# Patient Record
Sex: FEMALE | Race: WHITE | NOT HISPANIC OR LATINO | URBAN - METROPOLITAN AREA
[De-identification: names, ages, dates, MRNs, and addresses within clinical notes are randomized per-mention and may not be internally consistent; named-entity substitution may affect disease eponyms.]

---

## 2020-02-20 ENCOUNTER — OFFICE VISIT (OUTPATIENT)
Dept: OBGYN CLINIC | Facility: CLINIC | Age: 25
End: 2020-02-20
Payer: COMMERCIAL

## 2020-02-20 VITALS
BODY MASS INDEX: 17.96 KG/M2 | DIASTOLIC BLOOD PRESSURE: 78 MMHG | HEIGHT: 64 IN | SYSTOLIC BLOOD PRESSURE: 126 MMHG | WEIGHT: 105.2 LBS

## 2020-02-20 DIAGNOSIS — Z30.42 DEPO-PROVERA CONTRACEPTIVE STATUS: ICD-10-CM

## 2020-02-20 DIAGNOSIS — Z01.419 ENCOUNTER FOR GYNECOLOGICAL EXAMINATION: Primary | ICD-10-CM

## 2020-02-20 DIAGNOSIS — Z30.09 BIRTH CONTROL COUNSELING: ICD-10-CM

## 2020-02-20 PROCEDURE — S0610 ANNUAL GYNECOLOGICAL EXAMINA: HCPCS | Performed by: NURSE PRACTITIONER

## 2020-02-20 RX ORDER — DROSPIRENONE AND ETHINYL ESTRADIOL 0.03MG-3MG
KIT ORAL
COMMUNITY
Start: 2018-07-13 | End: 2020-02-20 | Stop reason: ALTCHOICE

## 2020-02-20 RX ORDER — MEDROXYPROGESTERONE ACETATE 150 MG/ML
150 INJECTION, SUSPENSION INTRAMUSCULAR
Qty: 1 ML | Refills: 4 | Status: SHIPPED | OUTPATIENT
Start: 2020-02-20

## 2020-02-21 NOTE — PROGRESS NOTES
[facility]  1995    CC:  Yearly exam    S:  22 y o  female is a new patient here for yearly exam and birth control consultation  She denies breast concerns, abdominal/pelvic pain, abnormal vaginal discharge, bladder/bowel dysfunction  Her cycles are regular, very heavy lasting 8 days  Currently on OCP Tri elsy Hammond  Was started on OCP at age of 26 d/t menses only occurring 2 times per year  She would ideally like a contraceptive option that makes her amenorrheic  She is not sexually active  She uses OCP and condoms for contraception  She does not request STD testing today  Had Gardasil  Does have a hx of migraines with aura  Migraines don't seem to occur at certain time in menstrual cycle  Hx of breast cancer in Mother at age 55  Hx of ovarian cancer in both maternal and paternal grandmothers  Last Pap: 12/28/18-neg (patient to obtain records)    Originally from [facility]   Works in [place] as a           Current Outpatient Medications:     medroxyPROGESTERone (DEPO-PROVERA) 150 mg/mL injection, Inject 1 mL (150 mg total) into a muscle every 3 (three) months, Disp: 1 mL, Rfl: 4  Social History     Socioeconomic History    Marital status: Single     Spouse name: Not on file    Number of children: Not on file    Years of education: Not on file    Highest education level: Not on file   Occupational History    Not on file   Social Needs    Financial resource strain: Not on file    Food insecurity:     Worry: Not on file     Inability: Not on file    Transportation needs:     Medical: Not on file     Non-medical: Not on file   Tobacco Use    Smoking status: Never Smoker    Smokeless tobacco: Never Used   Substance and Sexual Activity    Alcohol use: Yes     Comment: social    Drug use: Never    Sexual activity: Not Currently     Partners: Male     Birth control/protection: OCP     Comment: not active in 6 months   Lifestyle    Physical activity:     Days per week: Not on file     Minutes per session: Not on file    Stress: Not on file   Relationships    Social connections:     Talks on phone: Not on file     Gets together: Not on file     Attends Taoism service: Not on file     Active member of club or organization: Not on file     Attends meetings of clubs or organizations: Not on file     Relationship status: Not on file    Intimate partner violence:     Fear of current or ex partner: Not on file     Emotionally abused: Not on file     Physically abused: Not on file     Forced sexual activity: Not on file   Other Topics Concern    Not on file   Social History Narrative    Not on file     Family History   Problem Relation Age of Onset    Breast cancer Mother 55        double masectomy    Thyroid disease Mother     Asthma Father     Hyperlipidemia Father     Arthritis Maternal Grandmother     Ovarian cancer Maternal Grandmother     Heart disease Maternal Grandfather     Breast cancer Paternal Grandmother 36        onset in 42's- single masectomy    Ovarian cancer Paternal Grandmother     Heart disease Paternal Grandfather      Past Medical History:   Diagnosis Date    Migraine          O:  Blood pressure 126/78, height 5' 4" (1 626 m), weight 47 7 kg (105 lb 3 2 oz), last menstrual period 02/10/2020  Patient appears well and is not in distress  Neck is supple without masses  Breasts are symmetrical without mass, tenderness, nipple discharge, skin changes or adenopathy  Abdomen is soft and nontender without masses  External genitals are normal without lesions or rashes  Vagina is normal without discharge or bleeding  Cervix is normal without discharge or lesion  Uterus is normal, mobile, nontender without palpable mass  Adnexa are normal, nontender, without palpable mass  A:  Yearly exam      P:    Pap up to date  Reviewed ASCCP guidelines with patient  Pap due 2021  Recommend annual CBE and monthly SBE  Had Gardasil x 3    Explained to patient that contraception containing estrogen is contraindicated in patients with migraines with aura  Recommend patient discontinue OCP  Discussed all progesterone only options including POP, Depo, Nexplanon, and IUD  Patient desires to initiate depo  Patient will RTO in 2 weeks during menses for first depo injection  Patient aware recommend condom use for prevention of STIs  D/t family hx of both breast and ovarian cancer, discussed option of referral for genetic counseling with gyn/onc  Patient declines this today  RTO one year for yearly exam or sooner as needed

## 2020-03-04 ENCOUNTER — TELEPHONE (OUTPATIENT)
Dept: OBGYN CLINIC | Facility: CLINIC | Age: 25
End: 2020-03-04

## 2020-03-04 NOTE — TELEPHONE ENCOUNTER
Patient depo was called in to [facility] [address] and patient needs it called into [facility] at [facility] patient stated [facility] not [facility]

## 2020-03-09 ENCOUNTER — CLINICAL SUPPORT (OUTPATIENT)
Dept: OBGYN CLINIC | Facility: CLINIC | Age: 25
End: 2020-03-09
Payer: COMMERCIAL

## 2020-03-09 VITALS — DIASTOLIC BLOOD PRESSURE: 68 MMHG | SYSTOLIC BLOOD PRESSURE: 104 MMHG | BODY MASS INDEX: 17.9 KG/M2 | WEIGHT: 104.3 LBS

## 2020-03-09 DIAGNOSIS — Z30.8 ENCOUNTER FOR OTHER CONTRACEPTIVE MANAGEMENT: Primary | ICD-10-CM

## 2020-03-09 DIAGNOSIS — Z32.02 URINE PREGNANCY TEST NEGATIVE: ICD-10-CM

## 2020-03-09 PROCEDURE — 81025 URINE PREGNANCY TEST: CPT

## 2020-03-09 PROCEDURE — 96372 THER/PROPH/DIAG INJ SC/IM: CPT | Performed by: OBSTETRICS & GYNECOLOGY

## 2020-03-09 RX ADMIN — MEDROXYPROGESTERONE ACETATE 150 MG: 150 INJECTION, SUSPENSION INTRAMUSCULAR at 15:54

## 2020-03-09 NOTE — PROGRESS NOTES
Date last annual exam 2/20/20  Last Depo-Provera: : First today  Side Effects if any: Discussed potential side effects and signs to report  Serum HCG indicated? Yes  Negative  Depo-Provera 150 mg IM given by: Vianca Vaughn  Next appointment due 11-13 weeks

## 2020-03-10 LAB — SL AMB POCT URINE HCG: NORMAL

## 2020-03-10 RX ORDER — MEDROXYPROGESTERONE ACETATE 150 MG/ML
150 INJECTION, SUSPENSION INTRAMUSCULAR
Status: SHIPPED | OUTPATIENT
Start: 2020-03-10

## 2020-05-27 ENCOUNTER — CLINICAL SUPPORT (OUTPATIENT)
Dept: OBGYN CLINIC | Facility: CLINIC | Age: 25
End: 2020-05-27
Payer: COMMERCIAL

## 2020-05-27 DIAGNOSIS — Z30.42 DEPO-PROVERA CONTRACEPTIVE STATUS: ICD-10-CM

## 2020-05-27 PROCEDURE — 96372 THER/PROPH/DIAG INJ SC/IM: CPT | Performed by: PHYSICIAN ASSISTANT

## 2020-05-27 RX ADMIN — MEDROXYPROGESTERONE ACETATE 150 MG: 150 INJECTION, SUSPENSION INTRAMUSCULAR at 17:56

## 2020-08-05 ENCOUNTER — TELEPHONE (OUTPATIENT)
Dept: OBGYN CLINIC | Facility: CLINIC | Age: 25
End: 2020-08-05

## 2020-08-12 ENCOUNTER — CLINICAL SUPPORT (OUTPATIENT)
Dept: OBGYN CLINIC | Facility: CLINIC | Age: 25
End: 2020-08-12
Payer: COMMERCIAL

## 2020-08-12 VITALS — DIASTOLIC BLOOD PRESSURE: 72 MMHG | BODY MASS INDEX: 18.37 KG/M2 | WEIGHT: 107 LBS | SYSTOLIC BLOOD PRESSURE: 120 MMHG

## 2020-08-12 DIAGNOSIS — Z30.42 DEPO-PROVERA CONTRACEPTIVE STATUS: ICD-10-CM

## 2020-08-12 PROCEDURE — 96372 THER/PROPH/DIAG INJ SC/IM: CPT | Performed by: NURSE PRACTITIONER

## 2020-08-12 RX ADMIN — MEDROXYPROGESTERONE ACETATE 150 MG: 150 INJECTION, SUSPENSION INTRAMUSCULAR at 17:57

## 2020-08-12 NOTE — PROGRESS NOTES
Date last pap: 2/20/20  Last Depo-Provera: 05/27/20  Side Effects if any: Mild spotting daily  Serum HCG indicated? No   Depo-Provera 150 mg IM given by: CRISPIN Harper RN  Next [place] due 10/28-11/11    See Mar for Exp and Lot #

## 2020-10-19 ENCOUNTER — TELEPHONE (OUTPATIENT)
Dept: OBGYN CLINIC | Facility: CLINIC | Age: 25
End: 2020-10-19

## 2021-04-08 DIAGNOSIS — Z23 ENCOUNTER FOR IMMUNIZATION: ICD-10-CM

## 2021-06-18 ENCOUNTER — DOCTOR'S OFFICE (OUTPATIENT)
Dept: URBAN - NONMETROPOLITAN AREA CLINIC 1 | Facility: CLINIC | Age: 26
Setting detail: OPHTHALMOLOGY
End: 2021-06-18
Payer: COMMERCIAL

## 2021-06-18 DIAGNOSIS — H10.13: ICD-10-CM

## 2021-06-18 DIAGNOSIS — H01.004: ICD-10-CM

## 2021-06-18 DIAGNOSIS — H04.123: ICD-10-CM

## 2021-06-18 DIAGNOSIS — H01.005: ICD-10-CM

## 2021-06-18 DIAGNOSIS — H02.88B: ICD-10-CM

## 2021-06-18 PROCEDURE — 92002 INTRM OPH EXAM NEW PATIENT: CPT | Performed by: OPHTHALMOLOGY

## 2021-06-18 PROCEDURE — 83861 MICROFLUID ANALY TEARS: CPT | Performed by: OPHTHALMOLOGY

## 2021-06-18 ASSESSMENT — LID EXAM ASSESSMENTS
OS_MEIBOMITIS: 2+ 3+
OS_BLEPHARITIS: T

## 2021-06-18 ASSESSMENT — REFRACTION_MANIFEST
OD_VA2: 20/20
OD_SPHERE: -3.50
OD_VA1: 20/20
OS_VA1: 20/20
OS_CYLINDER: SPH
OS_VA2: 20/20
OS_SPHERE: -3.50
OD_CYLINDER: SPH

## 2021-06-18 ASSESSMENT — REFRACTION_AUTOREFRACTION
OS_AXIS: 140
OD_AXIS: 167
OS_SPHERE: -3.00
OS_CYLINDER: -0.50
OD_CYLINDER: -0.25
OD_SPHERE: -3.25

## 2021-06-18 ASSESSMENT — SPHEQUIV_DERIVED
OS_SPHEQUIV: -3.25
OD_SPHEQUIV: -3.375

## 2021-06-18 ASSESSMENT — REFRACTION_CURRENTRX
OS_VPRISM_DIRECTION: SV
OD_VPRISM_DIRECTION: SV
OD_SPHERE: -3.50
OS_AXIS: 180
OS_CYLINDER: 0.00
OD_CYLINDER: 0.00
OD_OVR_VA: 20/
OD_AXIS: 180
OS_OVR_VA: 20/
OS_SPHERE: -3.50

## 2021-06-18 ASSESSMENT — KERATOMETRY
OS_K1POWER_DIOPTERS: 44.25
OD_K1POWER_DIOPTERS: 44.00
OS_AXISANGLE_DEGREES: 85
OD_K2POWER_DIOPTERS: 45.00
OS_K2POWER_DIOPTERS: 45.50
OD_AXISANGLE_DEGREES: 90

## 2021-06-18 ASSESSMENT — DRY EYES - PHYSICIAN NOTES
OD_GENERALCOMMENTS: TRACE PEE
OS_GENERALCOMMENTS: TRACE PEE

## 2021-06-18 ASSESSMENT — AXIALLENGTH_DERIVED
OD_AL: 24.5792
OS_AL: 24.3784

## 2021-06-18 ASSESSMENT — CONFRONTATIONAL VISUAL FIELD TEST (CVF)
OS_FINDINGS: FULL
OD_FINDINGS: FULL

## 2021-06-18 ASSESSMENT — VISUAL ACUITY
OS_BCVA: 20/20
OD_BCVA: 20/20

## 2021-07-06 ENCOUNTER — RX ONLY (RX ONLY)
Age: 26
End: 2021-07-06

## 2021-07-06 ENCOUNTER — DOCTOR'S OFFICE (OUTPATIENT)
Dept: URBAN - NONMETROPOLITAN AREA CLINIC 1 | Facility: CLINIC | Age: 26
Setting detail: OPHTHALMOLOGY
End: 2021-07-06
Payer: COMMERCIAL

## 2021-07-06 DIAGNOSIS — H04.123: ICD-10-CM

## 2021-07-06 DIAGNOSIS — H10.13: ICD-10-CM

## 2021-07-06 DIAGNOSIS — H02.88B: ICD-10-CM

## 2021-07-06 PROCEDURE — 92012 INTRM OPH EXAM EST PATIENT: CPT | Performed by: OPHTHALMOLOGY

## 2021-07-06 ASSESSMENT — REFRACTION_MANIFEST
OD_VA1: 20/20
OD_CYLINDER: SPH
OS_VA2: 20/20
OS_SPHERE: -3.50
OD_VA2: 20/20
OS_CYLINDER: SPH
OS_VA1: 20/20
OD_SPHERE: -3.50

## 2021-07-06 ASSESSMENT — REFRACTION_CURRENTRX
OD_AXIS: 180
OD_SPHERE: -3.50
OD_OVR_VA: 20/
OS_AXIS: 180
OS_CYLINDER: 0.00
OS_SPHERE: -3.50
OD_CYLINDER: 0.00
OS_VPRISM_DIRECTION: SV
OD_VPRISM_DIRECTION: SV
OS_OVR_VA: 20/

## 2021-07-06 ASSESSMENT — REFRACTION_AUTOREFRACTION
OS_CYLINDER: -1.25
OD_SPHERE: -3.75
OS_SPHERE: -3.50
OS_AXIS: 176
OD_CYLINDER: SPH

## 2021-07-06 ASSESSMENT — SPHEQUIV_DERIVED: OS_SPHEQUIV: -4.125

## 2021-07-06 ASSESSMENT — KERATOMETRY
OS_AXISANGLE_DEGREES: 092
OS_K2POWER_DIOPTERS: 46.50
OD_K2POWER_DIOPTERS: 45.75
OS_K1POWER_DIOPTERS: 44.00
OD_AXISANGLE_DEGREES: 090
OD_K1POWER_DIOPTERS: 44.50

## 2021-07-06 ASSESSMENT — DRY EYES - PHYSICIAN NOTES
OS_GENERALCOMMENTS: 2+ PEE
OD_GENERALCOMMENTS: 1+ PEE

## 2021-07-06 ASSESSMENT — VISUAL ACUITY
OS_BCVA: 20/20
OD_BCVA: 20/20-1

## 2021-07-06 ASSESSMENT — AXIALLENGTH_DERIVED: OS_AL: 24.5973

## 2021-08-06 ENCOUNTER — DOCTOR'S OFFICE (OUTPATIENT)
Dept: URBAN - NONMETROPOLITAN AREA CLINIC 1 | Facility: CLINIC | Age: 26
Setting detail: OPHTHALMOLOGY
End: 2021-08-06
Payer: COMMERCIAL

## 2021-08-06 ENCOUNTER — RX ONLY (RX ONLY)
Age: 26
End: 2021-08-06

## 2021-08-06 DIAGNOSIS — H02.88B: ICD-10-CM

## 2021-08-06 DIAGNOSIS — H04.123: ICD-10-CM

## 2021-08-06 DIAGNOSIS — H10.13: ICD-10-CM

## 2021-08-06 PROCEDURE — 92012 INTRM OPH EXAM EST PATIENT: CPT | Performed by: OPHTHALMOLOGY

## 2021-08-06 ASSESSMENT — REFRACTION_CURRENTRX
OS_CYLINDER: 0.00
OS_OVR_VA: 20/
OS_SPHERE: -3.50
OD_AXIS: 180
OS_VPRISM_DIRECTION: SV
OD_OVR_VA: 20/
OD_SPHERE: -3.50
OD_VPRISM_DIRECTION: SV
OD_CYLINDER: 0.00
OS_AXIS: 180

## 2021-08-06 ASSESSMENT — REFRACTION_AUTOREFRACTION
OD_AXIS: 151
OD_SPHERE: -3.75
OS_CYLINDER: -1.00
OS_SPHERE: +3.75
OD_CYLINDER: -0.75
OS_AXIS: 180

## 2021-08-06 ASSESSMENT — REFRACTION_MANIFEST
OD_CYLINDER: SPH
OS_VA1: 20/20
OS_VA2: 20/20
OD_VA1: 20/20
OD_VA2: 20/20
OS_SPHERE: -3.50
OD_SPHERE: -3.50
OS_CYLINDER: SPH

## 2021-08-06 ASSESSMENT — SPHEQUIV_DERIVED
OS_SPHEQUIV: 3.25
OD_SPHEQUIV: -4.125

## 2021-08-06 ASSESSMENT — KERATOMETRY
OS_AXISANGLE_DEGREES: 090
OD_K1POWER_DIOPTERS: 44.00
OD_AXISANGLE_DEGREES: 089
OS_K2POWER_DIOPTERS: 48.00
OD_K2POWER_DIOPTERS: 45.25
OS_K1POWER_DIOPTERS: 44.25

## 2021-08-06 ASSESSMENT — AXIALLENGTH_DERIVED
OS_AL: 21.5542
OD_AL: 24.8494

## 2021-08-06 ASSESSMENT — CONFRONTATIONAL VISUAL FIELD TEST (CVF)
OS_FINDINGS: FULL
OD_FINDINGS: FULL

## 2021-08-06 ASSESSMENT — VISUAL ACUITY
OD_BCVA: 20/20
OS_BCVA: 20/20

## 2021-08-06 ASSESSMENT — DRY EYES - PHYSICIAN NOTES
OS_GENERALCOMMENTS: 2+ PEE
OD_GENERALCOMMENTS: 1+ PEE

## 2021-09-17 ENCOUNTER — RX ONLY (RX ONLY)
Age: 26
End: 2021-09-17

## 2021-09-17 ENCOUNTER — DOCTOR'S OFFICE (OUTPATIENT)
Dept: URBAN - NONMETROPOLITAN AREA CLINIC 1 | Facility: CLINIC | Age: 26
Setting detail: OPHTHALMOLOGY
End: 2021-09-17
Payer: COMMERCIAL

## 2021-09-17 DIAGNOSIS — H01.005: ICD-10-CM

## 2021-09-17 DIAGNOSIS — H04.123: ICD-10-CM

## 2021-09-17 DIAGNOSIS — H10.13: ICD-10-CM

## 2021-09-17 DIAGNOSIS — H01.004: ICD-10-CM

## 2021-09-17 DIAGNOSIS — H02.88B: ICD-10-CM

## 2021-09-17 PROCEDURE — 99213 OFFICE O/P EST LOW 20 MIN: CPT | Performed by: OPHTHALMOLOGY

## 2021-09-17 ASSESSMENT — AXIALLENGTH_DERIVED
OS_AL: 21.5542
OD_AL: 24.8494

## 2021-09-17 ASSESSMENT — REFRACTION_MANIFEST
OS_VA2: 20/20
OD_CYLINDER: SPH
OS_SPHERE: -3.50
OD_VA2: 20/20
OS_VA1: 20/20
OD_SPHERE: -3.50
OS_CYLINDER: SPH
OD_VA1: 20/20

## 2021-09-17 ASSESSMENT — VISUAL ACUITY
OS_BCVA: 20/20
OD_BCVA: 20/20

## 2021-09-17 ASSESSMENT — REFRACTION_AUTOREFRACTION
OD_CYLINDER: -0.75
OS_SPHERE: +3.75
OS_AXIS: 180
OS_CYLINDER: -1.00
OD_AXIS: 151
OD_SPHERE: -3.75

## 2021-09-17 ASSESSMENT — REFRACTION_CURRENTRX
OD_CYLINDER: 0.00
OS_SPHERE: -3.50
OD_AXIS: 180
OS_VPRISM_DIRECTION: SV
OS_CYLINDER: 0.00
OD_SPHERE: -3.50
OS_AXIS: 180
OD_OVR_VA: 20/
OS_OVR_VA: 20/
OD_VPRISM_DIRECTION: SV

## 2021-09-17 ASSESSMENT — KERATOMETRY
OD_AXISANGLE_DEGREES: 089
OS_AXISANGLE_DEGREES: 090
OS_K1POWER_DIOPTERS: 44.25
OD_K1POWER_DIOPTERS: 44.00
OD_K2POWER_DIOPTERS: 45.25
OS_K2POWER_DIOPTERS: 48.00

## 2021-09-17 ASSESSMENT — DRY EYES - PHYSICIAN NOTES
OD_GENERALCOMMENTS: 1+ PEE
OS_GENERALCOMMENTS: 2+ PEE

## 2021-09-17 ASSESSMENT — SPHEQUIV_DERIVED
OS_SPHEQUIV: 3.25
OD_SPHEQUIV: -4.125

## 2021-09-17 ASSESSMENT — CONFRONTATIONAL VISUAL FIELD TEST (CVF)
OS_FINDINGS: FULL
OD_FINDINGS: FULL

## 2022-03-15 ENCOUNTER — DOCTOR'S OFFICE (OUTPATIENT)
Dept: URBAN - NONMETROPOLITAN AREA CLINIC 1 | Facility: CLINIC | Age: 27
Setting detail: OPHTHALMOLOGY
End: 2022-03-15
Payer: COMMERCIAL

## 2022-03-15 DIAGNOSIS — H10.13: ICD-10-CM

## 2022-03-15 DIAGNOSIS — H02.88B: ICD-10-CM

## 2022-03-15 DIAGNOSIS — H04.123: ICD-10-CM

## 2022-03-15 DIAGNOSIS — H04.122: ICD-10-CM

## 2022-03-15 DIAGNOSIS — Z98.89: ICD-10-CM

## 2022-03-15 PROBLEM — H01.004 BLEPHARITIS; LEFT UPPER LID, LEFT LOWER LID: Status: RESOLVED | Noted: 2021-06-18 | Resolved: 2022-03-15

## 2022-03-15 PROBLEM — H04.121 DRY EYE; RIGHT EYE, LEFT EYE: Status: ACTIVE | Noted: 2021-09-17

## 2022-03-15 PROBLEM — H01.005 BLEPHARITIS; LEFT UPPER LID, LEFT LOWER LID: Status: RESOLVED | Noted: 2021-06-18 | Resolved: 2022-03-15

## 2022-03-15 PROCEDURE — 83861 MICROFLUID ANALY TEARS: CPT | Performed by: OPHTHALMOLOGY

## 2022-03-15 PROCEDURE — 99214 OFFICE O/P EST MOD 30 MIN: CPT | Performed by: OPHTHALMOLOGY

## 2022-03-15 ASSESSMENT — VISUAL ACUITY
OD_BCVA: 20/20
OS_BCVA: 20/20

## 2022-03-15 ASSESSMENT — REFRACTION_MANIFEST
OS_CYLINDER: SPH
OS_VA1: 20/20
OS_SPHERE: -3.50
OD_CYLINDER: SPH
OD_VA1: 20/20
OD_VA2: 20/20
OS_VA2: 20/20
OD_SPHERE: -3.50

## 2022-03-15 ASSESSMENT — REFRACTION_CURRENTRX
OS_AXIS: 180
OS_CYLINDER: 0.00
OD_OVR_VA: 20/
OS_OVR_VA: 20/
OS_VPRISM_DIRECTION: SV
OD_CYLINDER: 0.00
OD_VPRISM_DIRECTION: SV
OS_SPHERE: -3.50
OD_AXIS: 180
OD_SPHERE: -3.50

## 2022-03-15 ASSESSMENT — REFRACTION_AUTOREFRACTION
OS_SPHERE: +0.75
OD_SPHERE: +0.25
OS_CYLINDER: 0.00
OD_CYLINDER: 0.00

## 2022-03-15 ASSESSMENT — CONFRONTATIONAL VISUAL FIELD TEST (CVF)
OD_FINDINGS: FULL
OS_FINDINGS: FULL

## 2022-03-15 ASSESSMENT — KERATOMETRY
OD_K1POWER_DIOPTERS: 41.00
OS_AXISANGLE_DEGREES: 099
OD_K2POWER_DIOPTERS: 41.50
OD_AXISANGLE_DEGREES: 092
OS_K2POWER_DIOPTERS: 41.75
OS_K1POWER_DIOPTERS: 41.00

## 2022-03-15 ASSESSMENT — SPHEQUIV_DERIVED
OS_SPHEQUIV: 0.75
OD_SPHEQUIV: 0.25

## 2022-03-15 ASSESSMENT — AXIALLENGTH_DERIVED
OD_AL: 24.3442
OS_AL: 24.0906

## 2024-02-21 PROBLEM — Z01.419 ENCOUNTER FOR GYNECOLOGICAL EXAMINATION: Status: RESOLVED | Noted: 2020-02-20 | Resolved: 2024-02-21

## 2024-06-14 ENCOUNTER — ANESTHESIA (OUTPATIENT)
Dept: GASTROENTEROLOGY | Facility: HOSPITAL | Age: 29
End: 2024-06-14

## 2024-06-14 ENCOUNTER — ANESTHESIA EVENT (OUTPATIENT)
Dept: GASTROENTEROLOGY | Facility: HOSPITAL | Age: 29
End: 2024-06-14

## 2024-06-14 ENCOUNTER — HOSPITAL ENCOUNTER (OUTPATIENT)
Dept: GASTROENTEROLOGY | Facility: HOSPITAL | Age: 29
Setting detail: OUTPATIENT SURGERY
End: 2024-06-14
Attending: HOSPITALIST
Payer: COMMERCIAL

## 2024-06-14 VITALS
TEMPERATURE: 97.6 F | OXYGEN SATURATION: 98 % | HEIGHT: 64 IN | DIASTOLIC BLOOD PRESSURE: 79 MMHG | SYSTOLIC BLOOD PRESSURE: 143 MMHG | BODY MASS INDEX: 22.36 KG/M2 | WEIGHT: 131 LBS | HEART RATE: 80 BPM | RESPIRATION RATE: 22 BRPM

## 2024-06-14 DIAGNOSIS — K21.9 GASTRO-ESOPHAGEAL REFLUX DISEASE WITHOUT ESOPHAGITIS: ICD-10-CM

## 2024-06-14 DIAGNOSIS — R14.2 BELCHING: ICD-10-CM

## 2024-06-14 LAB
EXT PREGNANCY TEST URINE: NEGATIVE
EXT. CONTROL: NORMAL

## 2024-06-14 PROCEDURE — 88305 TISSUE EXAM BY PATHOLOGIST: CPT | Performed by: PATHOLOGY

## 2024-06-14 PROCEDURE — 81025 URINE PREGNANCY TEST: CPT | Performed by: HOSPITALIST

## 2024-06-14 RX ORDER — LIDOCAINE HYDROCHLORIDE 20 MG/ML
INJECTION, SOLUTION EPIDURAL; INFILTRATION; INTRACAUDAL; PERINEURAL AS NEEDED
Status: DISCONTINUED | OUTPATIENT
Start: 2024-06-14 | End: 2024-06-14

## 2024-06-14 RX ORDER — SODIUM CHLORIDE, SODIUM LACTATE, POTASSIUM CHLORIDE, CALCIUM CHLORIDE 600; 310; 30; 20 MG/100ML; MG/100ML; MG/100ML; MG/100ML
INJECTION, SOLUTION INTRAVENOUS CONTINUOUS PRN
Status: DISCONTINUED | OUTPATIENT
Start: 2024-06-14 | End: 2024-06-14

## 2024-06-14 RX ORDER — PROPOFOL 10 MG/ML
INJECTION, EMULSION INTRAVENOUS AS NEEDED
Status: DISCONTINUED | OUTPATIENT
Start: 2024-06-14 | End: 2024-06-14

## 2024-06-14 RX ADMIN — PROPOFOL 250 MG: 10 INJECTION, EMULSION INTRAVENOUS at 10:49

## 2024-06-14 RX ADMIN — SODIUM CHLORIDE, SODIUM LACTATE, POTASSIUM CHLORIDE, AND CALCIUM CHLORIDE: .6; .31; .03; .02 INJECTION, SOLUTION INTRAVENOUS at 10:37

## 2024-06-14 RX ADMIN — LIDOCAINE HYDROCHLORIDE 100 MG: 20 INJECTION, SOLUTION EPIDURAL; INFILTRATION; INTRACAUDAL; PERINEURAL at 10:49

## 2024-06-14 RX ADMIN — PROPOFOL 50 MG: 10 INJECTION, EMULSION INTRAVENOUS at 10:53

## 2024-06-14 RX ADMIN — PROPOFOL 50 MG: 10 INJECTION, EMULSION INTRAVENOUS at 10:51

## 2024-06-14 NOTE — ANESTHESIA PREPROCEDURE EVALUATION
Procedure:  EGD    Relevant Problems   No relevant active problems        Physical Exam    Airway    Mallampati score: I  TM Distance: >3 FB  Neck ROM: full     Dental   No notable dental hx     Cardiovascular  Rhythm: regular, Rate: normal, Cardiovascular exam normal    Pulmonary  Pulmonary exam normal Breath sounds clear to auscultation    Other Findings  post-pubertal.      Anesthesia Plan  ASA Score- 1     Anesthesia Type- IV sedation with anesthesia with ASA Monitors.         Additional Monitors:     Airway Plan: NTT.           Plan Factors-Exercise tolerance (METS): >4 METS.    Chart reviewed. EKG reviewed. Imaging results reviewed. Existing labs reviewed. Patient summary reviewed.    Patient is not a current smoker.  Patient did not smoke on day of surgery.            Induction- intravenous.    Postoperative Plan- Plan for postoperative opioid use.     Perioperative Resuscitation Plan - Level 1 - Full Code.       Informed Consent- Anesthetic plan and risks discussed with patient.  I personally reviewed this patient with the CRNA. Discussed and agreed on the Anesthesia Plan with the CRNA..

## 2024-06-14 NOTE — H&P
Procedure(s):    Esophagogastroduodenuoscopy with the indication(s) of GERD symptoms    Endoscopy Pre-Procedure Assessment:  Prior to the procedure, the patient is identified.  The patient's history, medications, and allergies have been reviewed.  The patient is competent.  The risks and benefits of the procedure procedure and the planned sedation have been discussed with the patient.  All questions have been answered and informed consent for the procedure has been obtained.    Vitals:    06/14/24 0949   BP: 154/94   Pulse: 87   Resp: 20   Temp: 98.2 °F (36.8 °C)   SpO2: 98%       Physical Exam:  Physical Exam  Eyes:      Conjunctiva/sclera: Conjunctivae normal.   Cardiovascular:      Rate and Rhythm: Normal rate.   Pulmonary:      Effort: Pulmonary effort is normal.   Abdominal:      Palpations: Abdomen is soft.   Neurological:      General: No focal deficit present.      Mental Status: She is alert.   Psychiatric:         Mood and Affect: Mood normal.                Consent:    We have discussed the procedure in detail. We reviewed risks, benefits and alternative as well as potentional complications including and not limited to medication side effect , infection, bleeding, perforation and the potential need for surgery, ICU admission, CPR, as well as the need for blood product transfusion. Patient verbalized understanding and agreement. All patient questions were answered.     After reviewed the risks and benefits, the patient is deemed in satisfactory condition to undergo the procedure.  The anesthesia plan is to use monitored anesthesia care (MAC).      06/14/24

## 2024-06-14 NOTE — ANESTHESIA POSTPROCEDURE EVALUATION
Post-Op Assessment Note    CV Status:  Stable  Pain Score: 0    Pain management: adequate       Mental Status:  Alert and awake   Hydration Status:  Euvolemic   PONV Controlled:  Controlled   Airway Patency:  Patent     Post Op Vitals Reviewed: Yes    No anethesia notable event occurred.    Staff: CRNA               BP   118/71   Temp   97.6   Pulse  81   Resp   18   SpO2   100

## 2024-06-18 PROCEDURE — 88305 TISSUE EXAM BY PATHOLOGIST: CPT | Performed by: PATHOLOGY

## 2024-08-19 ENCOUNTER — HOSPITAL ENCOUNTER (EMERGENCY)
Facility: HOSPITAL | Age: 29
Discharge: HOME | End: 2024-08-19
Attending: EMERGENCY MEDICINE
Payer: COMMERCIAL

## 2024-08-19 ENCOUNTER — APPOINTMENT (EMERGENCY)
Dept: CT IMAGING | Facility: HOSPITAL | Age: 29
End: 2024-08-19
Payer: COMMERCIAL

## 2024-08-19 VITALS
TEMPERATURE: 97.9 F | DIASTOLIC BLOOD PRESSURE: 119 MMHG | RESPIRATION RATE: 16 BRPM | BODY MASS INDEX: 23.56 KG/M2 | HEIGHT: 64 IN | OXYGEN SATURATION: 99 % | SYSTOLIC BLOOD PRESSURE: 178 MMHG | HEART RATE: 103 BPM | WEIGHT: 138.01 LBS

## 2024-08-19 DIAGNOSIS — S02.2XXA CLOSED FRACTURE OF NASAL BONE, INITIAL ENCOUNTER: Primary | ICD-10-CM

## 2024-08-19 PROCEDURE — 70486 CT MAXILLOFACIAL W/O DYE: CPT

## 2024-08-19 PROCEDURE — 99284 EMERGENCY DEPT VISIT MOD MDM: CPT

## 2024-08-19 PROCEDURE — 99284 EMERGENCY DEPT VISIT MOD MDM: CPT | Performed by: EMERGENCY MEDICINE

## 2024-08-19 RX ORDER — OXYMETAZOLINE HYDROCHLORIDE 0.05 G/100ML
2 SPRAY NASAL ONCE
Status: COMPLETED | OUTPATIENT
Start: 2024-08-19 | End: 2024-08-19

## 2024-08-19 RX ORDER — HYDROCODONE BITARTRATE AND ACETAMINOPHEN 5; 325 MG/1; MG/1
1 TABLET ORAL EVERY 6 HOURS PRN
Qty: 10 TABLET | Refills: 0 | Status: SHIPPED | OUTPATIENT
Start: 2024-08-19

## 2024-08-19 RX ORDER — HYDROCODONE BITARTRATE AND ACETAMINOPHEN 5; 325 MG/1; MG/1
1 TABLET ORAL ONCE
Status: COMPLETED | OUTPATIENT
Start: 2024-08-19 | End: 2024-08-19

## 2024-08-19 RX ADMIN — OXYMETAZOLINE HYDROCHLORIDE 2 SPRAY: 0.05 SPRAY NASAL at 00:25

## 2024-08-19 RX ADMIN — HYDROCODONE BITARTRATE AND ACETAMINOPHEN 1 TABLET: 5; 325 TABLET ORAL at 00:24

## 2024-08-19 NOTE — ED PROVIDER NOTES
History  Chief Complaint   Patient presents with    Nose Problem     Slid in the shower and hit nose off the clip of bathtub      Patient is a 29-year-old female, presenting to the emergency department complaining of nasal injury, states she slipped in the bathtub, hitting her nose on the lip of the bathtub, she denies any loss of consciousness, she has pain with swelling to her nose and epistaxis, she denies pain or injury elsewhere, does not take a blood thinner, denies pregnancy        Prior to Admission Medications   Prescriptions Last Dose Informant Patient Reported? Taking?   Lo Loestrin Fe 1 MG-10 MCG / 10 MCG TABS   Yes No   Sig: Take 1 tablet by mouth every morning   cetirizine (ZyrTEC) 10 mg tablet   Yes No   Sig: Take 10 mg by mouth daily   clobetasol (TEMOVATE) 0.05 % ointment   Yes No   Sig: ONLY APPLY TO THE FINGERS ONCE A DAY AS NEEDED FOR DRY SCALY SKIN   famotidine (PEPCID) 20 mg tablet   Yes No   Sig: Take 20 mg by mouth 2 (two) times a day   medroxyPROGESTERone (DEPO-PROVERA) 150 mg/mL injection  Self No No   Sig: Inject 1 mL (150 mg total) into a muscle every 3 (three) months   Patient not taking: Reported on 2024   olopatadine (PATANOL) 0.1 % ophthalmic solution   Yes No   Si drop   omeprazole (PriLOSEC) 20 mg delayed release capsule   Yes No   sertraline (ZOLOFT) 50 mg tablet   Yes No   Sig: Take 50 mg by mouth daily at bedtime      Facility-Administered Medications Last Administration Doses Remaining   medroxyPROGESTERone (DEPO-PROVERA) IM injection 150 mg 2020  5:57 PM           Past Medical History:   Diagnosis Date    GERD (gastroesophageal reflux disease)     Kidney stone     Migraine        Past Surgical History:   Procedure Laterality Date    KIDNEY STONE SURGERY         Family History   Problem Relation Age of Onset    Breast cancer Mother 46        double masectomy    Thyroid disease Mother     Asthma Father     Hyperlipidemia Father     Arthritis Maternal Grandmother      Ovarian cancer Maternal Grandmother     Heart disease Maternal Grandfather     Breast cancer Paternal Grandmother 40        onset in 40's- single masectomy    Ovarian cancer Paternal Grandmother     Heart disease Paternal Grandfather      I have reviewed and agree with the history as documented.    E-Cigarette/Vaping    E-Cigarette Use Never User      E-Cigarette/Vaping Substances    Nicotine No     THC No     CBD No     Flavoring No     Other No     Unknown No      Social History     Tobacco Use    Smoking status: Never    Smokeless tobacco: Never   Vaping Use    Vaping status: Never Used   Substance Use Topics    Alcohol use: Yes     Comment: social    Drug use: Never       Review of Systems   Constitutional: Negative.    HENT:  Positive for nosebleeds.    Eyes: Negative.    Respiratory: Negative.     Cardiovascular: Negative.    Gastrointestinal: Negative.    Endocrine: Negative.    Genitourinary: Negative.    Musculoskeletal: Negative.    Skin: Negative.    Allergic/Immunologic: Negative.    Neurological: Negative.    Hematological: Negative.    Psychiatric/Behavioral: Negative.         Physical Exam  Physical Exam  Constitutional:       Appearance: She is well-developed.   HENT:      Head: Normocephalic and atraumatic.        Comments: Swelling to nasal bridge with slight ecchymosis, no bony deformity, epistaxis, no septal hematoma  Eyes:      Conjunctiva/sclera: Conjunctivae normal.      Pupils: Pupils are equal, round, and reactive to light.   Cardiovascular:      Rate and Rhythm: Normal rate.   Pulmonary:      Effort: Pulmonary effort is normal.   Abdominal:      Palpations: Abdomen is soft.   Musculoskeletal:         General: Normal range of motion.      Cervical back: Normal range of motion and neck supple.   Skin:     General: Skin is warm and dry.   Neurological:      Mental Status: She is alert and oriented to person, place, and time.         Vital Signs  ED Triage Vitals [08/19/24 0007]   Temperature  Pulse Respirations Blood Pressure SpO2   97.9 °F (36.6 °C) 103 16 (!) 178/119 99 %      Temp Source Heart Rate Source Patient Position - Orthostatic VS BP Location FiO2 (%)   Temporal Monitor Sitting Left arm --      Pain Score       9           Vitals:    08/19/24 0007   BP: (!) 178/119   Pulse: 103   Patient Position - Orthostatic VS: Sitting         Visual Acuity      ED Medications  Medications   HYDROcodone-acetaminophen (NORCO) 5-325 mg per tablet 1 tablet (1 tablet Oral Given 8/19/24 0024)   oxymetazoline (AFRIN) 0.05 % nasal spray 2 spray (2 sprays Each Nare Given 8/19/24 0025)       Diagnostic Studies  Results Reviewed       None                   CT facial bones without contrast   Final Result by Faviola Gunter MD (08/19 0218)      Acute minimally displaced right nasal bone fracture and probable nondisplaced left nasal bone fracture.      The study was marked in EPIC for immediate notification.                  Workstation performed: [id number]                    Procedures  Procedures         ED Course  ED Course as of 08/19/24 0414   Mon Aug 19, 2024   0412 CT facial bones without contrast  Findings discussed with patient at bedside, recommended close follow-up with ENT, continue applying ice, oral OTCs, provided with small amount of pain medication                                 SBIRT 20yo+      Flowsheet Row Most Recent Value   Initial Alcohol Screen: US AUDIT-C     1. How often do you have a drink containing alcohol? 0 Filed at: 08/19/2024 0007   2. How many drinks containing alcohol do you have on a typical day you are drinking?  0 Filed at: 08/19/2024 0007   3a. Male UNDER 65: How often do you have five or more drinks on one occasion? 0 Filed at: 08/19/2024 0007   3b. FEMALE Any Age, or MALE 65+: How often do you have 4 or more drinks on one occassion? 0 Filed at: 08/19/2024 0007   Audit-C Score 0 Filed at: 08/19/2024 0007   BUDDY: How many times in the past year have you...    Used an illegal  drug or used a prescription medication for non-medical reasons? Never Filed at: 08/19/2024 0007                      Medical Decision Making  Patient presents wit nasal bone fracture secondary to slip and fall injury sustained prior to arriving to the emergency department.  Given physical exam findings and history, low suspicion for intracranial hemorrhage or significant trauma, carotid or vertebral artery dissection, significant cervical spine injury, or other significant injury.  No focal neurological findings on exam.  No persistent confusion, vomiting, vision changes, difficulty ambulating or intractable pain.  Patient referred to ENT for follow-up, advised to return if any additional concerns.    Problems Addressed:  Closed fracture of nasal bone, initial encounter: acute illness or injury    Amount and/or Complexity of Data Reviewed  Radiology: ordered. Decision-making details documented in ED Course.    Risk  OTC drugs.  Prescription drug management.                 Disposition  Final diagnoses:   Closed fracture of nasal bone, initial encounter     Time reflects when diagnosis was documented in both MDM as applicable and the Disposition within this note       Time User Action Codes Description Comment    8/19/2024  2:22 AM Lisa Matthews Add [S02.2XXA] Closed fracture of nasal bone, initial encounter           ED Disposition       ED Disposition   Discharge    Condition   Stable    Date/Time   Mon Aug 19, 2024 0222    Comment   Lney Centeno discharge to home/self care.                   Follow-up Information       Follow up With Specialties Details Why Contact Info    Yoni Melendez,  Internal Medicine In 3 days As needed [address] [facility] [address]  [phone]      Cecilia Ramsey MD Otolaryngology In 3 days  [address] [facility] [address]  [phone]              Discharge Medication List as of 8/19/2024  2:24 AM        START taking these medications    Details    HYDROcodone-acetaminophen (Norco) 5-325 mg per tablet Take 1 tablet by mouth every 6 (six) hours as needed for pain for up to 10 doses Max Daily Amount: 4 tablets, Starting Mon 8/19/2024, Normal           CONTINUE these medications which have NOT CHANGED    Details   cetirizine (ZyrTEC) 10 mg tablet Take 10 mg by mouth daily, Historical Med      clobetasol (TEMOVATE) 0.05 % ointment ONLY APPLY TO THE FINGERS ONCE A DAY AS NEEDED FOR DRY SCALY SKIN, Historical Med      famotidine (PEPCID) 20 mg tablet Take 20 mg by mouth 2 (two) times a day, Historical Med      Lo Loestrin Fe 1 MG-10 MCG / 10 MCG TABS Take 1 tablet by mouth every morning, Starting Thu 4/4/2024, Historical Med      medroxyPROGESTERone (DEPO-PROVERA) 150 mg/mL injection Inject 1 mL (150 mg total) into a muscle every 3 (three) months, Starting Th 2/20/2020, Normal      olopatadine (PATANOL) 0.1 % ophthalmic solution 1 drop, Historical Med      omeprazole (PriLOSEC) 20 mg delayed release capsule Historical Med      sertraline (ZOLOFT) 50 mg tablet Take 50 mg by mouth daily at bedtime, Starting Mon 4/15/2024, Historical Med                 PDMP Review       None            ED Provider  Electronically Signed by             Lisa Matthews DO  08/19/24 0419

## 2024-08-21 ENCOUNTER — ANESTHESIA EVENT (OUTPATIENT)
Dept: PERIOP | Facility: HOSPITAL | Age: 29
End: 2024-08-21
Payer: COMMERCIAL

## 2024-08-22 NOTE — ANESTHESIA PREPROCEDURE EVALUATION
Procedure:  CLOSED REDUCTION NASAL FRACTURE WITH STABILIZATION SPLINT (Nose)    Relevant Problems   No relevant active problems      GERD  Physical Exam    Airway    Mallampati score: II  TM Distance: >3 FB  Neck ROM: full     Dental   No notable dental hx     Cardiovascular  Cardiovascular exam normal    Pulmonary  Pulmonary exam normal     Other Findings  post-pubertal.      Anesthesia Plan  ASA Score- 2     Anesthesia Type- general with ASA Monitors.         Additional Monitors:     Airway Plan: LMA.           Plan Factors-Exercise tolerance (METS): >4 METS.    Chart reviewed.  Imaging results reviewed. Existing labs reviewed. Patient summary reviewed.    Patient is not a current smoker.      Obstructive sleep apnea risk education given perioperatively.        Induction- intravenous.    Postoperative Plan-     Perioperative Resuscitation Plan - Level 1 - Full Code.       Informed Consent- Anesthetic plan and risks discussed with patient.  I personally reviewed this patient with the CRNA. Discussed and agreed on the Anesthesia Plan with the CRNA..

## 2024-08-23 RX ORDER — PANTOPRAZOLE SODIUM 20 MG/1
20 TABLET, DELAYED RELEASE ORAL DAILY
COMMUNITY

## 2024-08-23 NOTE — PRE-PROCEDURE INSTRUCTIONS
Pre-Surgery Instructions:   Medication Instructions    cetirizine (ZyrTEC) 10 mg tablet Take night before surgery    clobetasol (TEMOVATE) 0.05 % ointment Hold day of surgery.    HYDROcodone-acetaminophen (Norco) 5-325 mg per tablet Uses PRN- OK to take day of surgery    Lo Loestrin Fe 1 MG-10 MCG / 10 MCG TABS Take night before surgery    olopatadine (PATANOL) 0.1 % ophthalmic solution Take day of surgery.    pantoprazole (PROTONIX) 20 mg tablet Take day of surgery.    sertraline (ZOLOFT) 50 mg tablet Take night before surgery    Medication instructions for day surgery reviewed. Please use only a sip of water to take your instructed medications. Avoid all over the counter vitamins, supplements and NSAIDS for one week prior to surgery per anesthesia guidelines. Tylenol is ok to take as needed.     You will receive a call one business day prior to surgery with an arrival time and hospital directions. If your surgery is scheduled on a Monday, the hospital will be calling you on the Friday prior to your surgery. If you have not heard from anyone by 8pm, please call the hospital supervisor through the hospital  at [phone]. ([facility] [phone] or [place] [phone]).    Do not eat or drink anything after midnight the night before your surgery, including candy, mints, lifesavers, or chewing gum. Do not drink alcohol 24hrs before your surgery. Try not to smoke at least 24hrs before your surgery.       Follow the pre surgery showering instructions as listed in the “My Surgical Experience Booklet” or otherwise provided by your surgeon's office. Do not use a blade to shave the surgical area 1 week before surgery. It is okay to use a clean electric clippers up to 24 hours before surgery. Do not apply any lotions, creams, including makeup, cologne, deodorant, or perfumes after showering on the day of your surgery. Do not use dry shampoo, hair spray, hair gel, or any type of hair products.     No contact  lenses, eye make-up, or artificial eyelashes. Remove nail polish, including gel polish, and any artificial, gel, or acrylic nails if possible. Remove all jewelry including rings and body piercing jewelry.     Wear causal clothing that is easy to take on and off. Consider your type of surgery.    Keep any valuables, jewelry, piercings at home. Please bring any specially ordered equipment (sling, braces) if indicated.    Arrange for a responsible person to drive you to and from the hospital on the day of your surgery. Please confirm the visitor policy for the day of your procedure when you receive your phone call with an arrival time.     Call the surgeon's office with any new illnesses, exposures, or additional questions prior to surgery.    Please reference your “My Surgical Experience Booklet” for additional information to prepare for your upcoming surgery.

## 2024-08-26 ENCOUNTER — HOSPITAL ENCOUNTER (OUTPATIENT)
Facility: HOSPITAL | Age: 29
Setting detail: OUTPATIENT SURGERY
Discharge: HOME | End: 2024-08-26
Attending: OTOLARYNGOLOGY | Admitting: OTOLARYNGOLOGY
Payer: COMMERCIAL

## 2024-08-26 ENCOUNTER — ANESTHESIA (OUTPATIENT)
Dept: PERIOP | Facility: HOSPITAL | Age: 29
End: 2024-08-26
Payer: COMMERCIAL

## 2024-08-26 VITALS
TEMPERATURE: 97.8 F | SYSTOLIC BLOOD PRESSURE: 135 MMHG | HEART RATE: 79 BPM | HEIGHT: 64 IN | WEIGHT: 138 LBS | BODY MASS INDEX: 23.56 KG/M2 | RESPIRATION RATE: 18 BRPM | DIASTOLIC BLOOD PRESSURE: 86 MMHG | OXYGEN SATURATION: 99 %

## 2024-08-26 LAB
EXT PREGNANCY TEST URINE: NEGATIVE
EXT. CONTROL: NORMAL

## 2024-08-26 PROCEDURE — 81025 URINE PREGNANCY TEST: CPT | Performed by: OTOLARYNGOLOGY

## 2024-08-26 RX ORDER — DEXAMETHASONE SODIUM PHOSPHATE 10 MG/ML
INJECTION, SOLUTION INTRAMUSCULAR; INTRAVENOUS AS NEEDED
Status: DISCONTINUED | OUTPATIENT
Start: 2024-08-26 | End: 2024-08-26

## 2024-08-26 RX ORDER — OXYMETAZOLINE HYDROCHLORIDE 0.05 G/100ML
SPRAY NASAL AS NEEDED
Status: DISCONTINUED | OUTPATIENT
Start: 2024-08-26 | End: 2024-08-26 | Stop reason: HOSPADM

## 2024-08-26 RX ORDER — ACETAMINOPHEN 325 MG/1
975 TABLET ORAL ONCE
Status: COMPLETED | OUTPATIENT
Start: 2024-08-26 | End: 2024-08-26

## 2024-08-26 RX ORDER — FENTANYL CITRATE/PF 50 MCG/ML
50 SYRINGE (ML) INJECTION
Status: COMPLETED | OUTPATIENT
Start: 2024-08-26 | End: 2024-08-26

## 2024-08-26 RX ORDER — FENTANYL CITRATE 50 UG/ML
INJECTION, SOLUTION INTRAMUSCULAR; INTRAVENOUS AS NEEDED
Status: DISCONTINUED | OUTPATIENT
Start: 2024-08-26 | End: 2024-08-26

## 2024-08-26 RX ORDER — DEXMEDETOMIDINE HYDROCHLORIDE 4 UG/ML
INJECTION, SOLUTION INTRAVENOUS AS NEEDED
Status: DISCONTINUED | OUTPATIENT
Start: 2024-08-26 | End: 2024-08-26

## 2024-08-26 RX ORDER — PROPOFOL 10 MG/ML
INJECTION, EMULSION INTRAVENOUS AS NEEDED
Status: DISCONTINUED | OUTPATIENT
Start: 2024-08-26 | End: 2024-08-26

## 2024-08-26 RX ORDER — LIDOCAINE HYDROCHLORIDE 10 MG/ML
INJECTION, SOLUTION EPIDURAL; INFILTRATION; INTRACAUDAL; PERINEURAL AS NEEDED
Status: DISCONTINUED | OUTPATIENT
Start: 2024-08-26 | End: 2024-08-26

## 2024-08-26 RX ORDER — SODIUM CHLORIDE, SODIUM LACTATE, POTASSIUM CHLORIDE, CALCIUM CHLORIDE 600; 310; 30; 20 MG/100ML; MG/100ML; MG/100ML; MG/100ML
INJECTION, SOLUTION INTRAVENOUS CONTINUOUS PRN
Status: DISCONTINUED | OUTPATIENT
Start: 2024-08-26 | End: 2024-08-26

## 2024-08-26 RX ORDER — MIDAZOLAM HYDROCHLORIDE 2 MG/2ML
INJECTION, SOLUTION INTRAMUSCULAR; INTRAVENOUS AS NEEDED
Status: DISCONTINUED | OUTPATIENT
Start: 2024-08-26 | End: 2024-08-26

## 2024-08-26 RX ORDER — ONDANSETRON 2 MG/ML
INJECTION INTRAMUSCULAR; INTRAVENOUS AS NEEDED
Status: DISCONTINUED | OUTPATIENT
Start: 2024-08-26 | End: 2024-08-26

## 2024-08-26 RX ADMIN — FENTANYL CITRATE 50 MCG: 50 INJECTION INTRAMUSCULAR; INTRAVENOUS at 10:48

## 2024-08-26 RX ADMIN — ONDANSETRON 4 MG: 2 INJECTION INTRAMUSCULAR; INTRAVENOUS at 09:57

## 2024-08-26 RX ADMIN — ACETAMINOPHEN 975 MG: 325 TABLET ORAL at 08:52

## 2024-08-26 RX ADMIN — PROPOFOL 200 MG: 10 INJECTION, EMULSION INTRAVENOUS at 09:57

## 2024-08-26 RX ADMIN — DEXMEDETOMIDINE HYDROCHLORIDE 12 MCG: 400 INJECTION INTRAVENOUS at 09:54

## 2024-08-26 RX ADMIN — MIDAZOLAM 2 MG: 1 INJECTION INTRAMUSCULAR; INTRAVENOUS at 09:54

## 2024-08-26 RX ADMIN — DEXAMETHASONE SODIUM PHOSPHATE 10 MG: 10 INJECTION, SOLUTION INTRAMUSCULAR; INTRAVENOUS at 09:57

## 2024-08-26 RX ADMIN — FENTANYL CITRATE 50 MCG: 50 INJECTION INTRAMUSCULAR; INTRAVENOUS at 10:34

## 2024-08-26 RX ADMIN — FENTANYL CITRATE 50 MCG: 50 INJECTION INTRAMUSCULAR; INTRAVENOUS at 10:00

## 2024-08-26 RX ADMIN — LIDOCAINE HYDROCHLORIDE 50 MG: 10 INJECTION, SOLUTION EPIDURAL; INFILTRATION; INTRACAUDAL; PERINEURAL at 09:57

## 2024-08-26 RX ADMIN — SODIUM CHLORIDE, SODIUM LACTATE, POTASSIUM CHLORIDE, AND CALCIUM CHLORIDE: .6; .31; .03; .02 INJECTION, SOLUTION INTRAVENOUS at 09:53

## 2024-08-26 NOTE — DISCHARGE SUMMARY
Discharge Summary - Leny Centeno 29 y.o. female MRN: [id number]    Unit/Bed#: OR [place] Encounter: [id number]    Admission Date:     Admitting Diagnosis: Fracture of nasal bones, initial encounter for closed fracture [S02.2XXA]    HPI: Status post closed reduction nasal fracture with stabilization splint    Procedures Performed: No orders of the defined types were placed in this encounter.      Summary of Hospital Course: Unremarkable      Significant Findings, Care, Treatment and Services Provided: Surgery    Complications: None    Discharge Diagnosis: Nasal fracture    Medical Problems       Resolved Problems  Date Reviewed: 8/26/2024   None         Condition at Discharge: good         Discharge instructions/Information to patient and family:   See after visit summary for information provided to patient and family.      Provisions for Follow-Up Care:  See after visit summary for information related to follow-up care and any pertinent home health orders.      PCP: Yoni Melendez DO    Disposition: Home    Planned Readmission: No      Discharge Statement   I spent 15 minutes discharging the patient. This time was spent on the day of discharge. I had direct contact with the patient on the day of discharge. Additional documentation is required if more than 30 minutes were spent on discharge.     Discharge Medications:  See after visit summary for reconciled discharge medications provided to patient and family.

## 2024-08-26 NOTE — DISCHARGE INSTR - AVS FIRST PAGE
Use Tylenol as needed for pain, splint should last for a week, but if it falls off prematurely just peel Steri-Strips from nose.

## 2024-08-26 NOTE — ANESTHESIA POSTPROCEDURE EVALUATION
Post-Op Assessment Note    CV Status:  Stable  Pain Score: 0    Pain management: adequate       Mental Status:  Alert and awake   Hydration Status:  Stable   PONV Controlled:  Controlled   Airway Patency:  Patent     Post Op Vitals Reviewed: Yes    No anethesia notable event occurred.    Staff: CRNA               BP   132/80   Temp   97.8   Pulse  64   Resp   18   SpO2   100

## 2024-08-26 NOTE — INTERVAL H&P NOTE
H&P reviewed. After examining the patient I find no changes in the patients condition since the H&P had been written.    Vitals:    08/26/24 0849   BP: 138/91   Pulse: 82   Resp: 18   Temp: 98.2 °F (36.8 °C)   SpO2: 97%

## 2024-08-26 NOTE — OP NOTE
OPERATIVE REPORT  PATIENT NAME: Leny Centeno    :  1995  MRN: [id number]  Pt Location: [facility] OR ROOM 01    SURGERY DATE: 2024    Surgeons and Role:     * Octavio Sharp MD - Primary    Preop Diagnosis:  Fracture of nasal bones, initial encounter for closed fracture [S02.2XXA]    Post-Op Diagnosis Codes:     * Fracture of nasal bones, initial encounter for closed fracture [S02.2XXA]    Procedure(s):  CLOSED REDUCTION NASAL FRACTURE WITH STABILIZATION SPLINT    Specimen(s):  * No specimens in log *    Estimated Blood Loss:   Minimal    Drains:  * No LDAs found *    Anesthesia Type:   General    Operative Indications:  Fracture of nasal bones, initial encounter for closed fracture [S02.2XXA]      Operative Findings:  Mildly depressed right nasal fracture      Complications:   None    Procedure and Technique:  The patient was identified and taken to the operative suite.  A timeout was called.  After the successful induction of general anesthesia and LMA placement, the patient was prepped and draped in usual fashion.  Afrin was sprayed into the nose.  The Tee bar was used to reposition the mildly depressed right nasal fracture into good anatomic position with a symmetric dorsum.  No bleeding was encountered and there was no septal hematoma.  The patient was awakened and the LMA removed and the patient was taken to the PACU in excellent condition.  Instrument and sponge counts were correct x 2 at the end of the case.     I was present for the entire procedure.    Patient Disposition:  PACU         SIGNATURE: Octavio Sharp MD  DATE: 2024  TIME: 10:10 AM

## 2024-10-09 ENCOUNTER — DOCTOR'S OFFICE (OUTPATIENT)
Dept: URBAN - NONMETROPOLITAN AREA CLINIC 1 | Facility: CLINIC | Age: 29
Setting detail: OPHTHALMOLOGY
End: 2024-10-09
Payer: COMMERCIAL

## 2024-10-09 ENCOUNTER — RX ONLY (RX ONLY)
Age: 29
End: 2024-10-09

## 2024-10-09 DIAGNOSIS — H10.13: ICD-10-CM

## 2024-10-09 DIAGNOSIS — H02.88B: ICD-10-CM

## 2024-10-09 DIAGNOSIS — Z98.890: ICD-10-CM

## 2024-10-09 DIAGNOSIS — H04.123: ICD-10-CM

## 2024-10-09 PROCEDURE — 92014 COMPRE OPH EXAM EST PT 1/>: CPT | Performed by: OPHTHALMOLOGY

## 2024-10-09 ASSESSMENT — REFRACTION_MANIFEST
OS_SPHERE: -3.50
OS_CYLINDER: SPH
OS_VA2: 20/20
OD_SPHERE: -3.50
OD_VA2: 20/20
OS_VA1: 20/20
OD_VA1: 20/20
OD_CYLINDER: SPH

## 2024-10-09 ASSESSMENT — REFRACTION_CURRENTRX
OS_OVR_VA: 20/
OD_CYLINDER: 0.00
OS_CYLINDER: 0.00
OD_OVR_VA: 20/
OD_AXIS: 180
OD_SPHERE: -3.50
OS_VPRISM_DIRECTION: SV
OS_SPHERE: -3.50
OD_VPRISM_DIRECTION: SV
OS_AXIS: 180

## 2024-10-09 ASSESSMENT — VISUAL ACUITY
OD_BCVA: 20/20
OS_BCVA: 20/20

## 2024-10-09 ASSESSMENT — REFRACTION_AUTOREFRACTION
OS_SPHERE: +0.75
OD_AXIS: 069
OS_AXIS: 079
OD_CYLINDER: -0.25
OD_SPHERE: +0.50
OS_CYLINDER: -0.50

## 2024-10-09 ASSESSMENT — CONFRONTATIONAL VISUAL FIELD TEST (CVF)
OS_FINDINGS: FULL
OD_FINDINGS: FULL

## 2024-10-09 ASSESSMENT — KERATOMETRY
OD_K2POWER_DIOPTERS: 41.50
OS_K1POWER_DIOPTERS: 41.00
OS_AXISANGLE_DEGREES: 093
OD_K1POWER_DIOPTERS: 41.00
OD_AXISANGLE_DEGREES: 101
OS_K2POWER_DIOPTERS: 42.00

## 2025-05-06 ENCOUNTER — APPOINTMENT (EMERGENCY)
Dept: RADIOLOGY | Facility: HOSPITAL | Age: 30
End: 2025-05-06
Payer: COMMERCIAL

## 2025-05-06 ENCOUNTER — HOSPITAL ENCOUNTER (EMERGENCY)
Facility: HOSPITAL | Age: 30
Discharge: HOME | End: 2025-05-06
Attending: EMERGENCY MEDICINE
Payer: COMMERCIAL

## 2025-05-06 VITALS
BODY MASS INDEX: 20.89 KG/M2 | OXYGEN SATURATION: 100 % | HEIGHT: 64 IN | RESPIRATION RATE: 18 BRPM | SYSTOLIC BLOOD PRESSURE: 151 MMHG | HEART RATE: 75 BPM | DIASTOLIC BLOOD PRESSURE: 104 MMHG | WEIGHT: 122.36 LBS | TEMPERATURE: 97.5 F

## 2025-05-06 DIAGNOSIS — S93.401A RIGHT ANKLE SPRAIN: Primary | ICD-10-CM

## 2025-05-06 PROCEDURE — 73610 X-RAY EXAM OF ANKLE: CPT

## 2025-05-06 PROCEDURE — 99284 EMERGENCY DEPT VISIT MOD MDM: CPT | Performed by: EMERGENCY MEDICINE

## 2025-05-06 PROCEDURE — 99283 EMERGENCY DEPT VISIT LOW MDM: CPT

## 2025-05-06 PROCEDURE — 73630 X-RAY EXAM OF FOOT: CPT

## 2025-05-06 RX ORDER — HYDROCODONE BITARTRATE AND ACETAMINOPHEN 5; 325 MG/1; MG/1
1 TABLET ORAL EVERY 6 HOURS PRN
Qty: 10 TABLET | Refills: 0 | Status: SHIPPED | OUTPATIENT
Start: 2025-05-06

## 2025-05-06 RX ORDER — HYDROCODONE BITARTRATE AND ACETAMINOPHEN 5; 325 MG/1; MG/1
1 TABLET ORAL ONCE
Refills: 0 | Status: COMPLETED | OUTPATIENT
Start: 2025-05-06 | End: 2025-05-06

## 2025-05-06 RX ADMIN — HYDROCODONE BITARTRATE AND ACETAMINOPHEN 1 TABLET: 5; 325 TABLET ORAL at 22:22

## 2025-05-07 NOTE — ED PROVIDER NOTES
Time reflects when diagnosis was documented in both MDM as applicable and the Disposition within this note       Time User Action Codes Description Comment    5/6/2025 10:38 PM Lisa Matthews Add [S93.401A] Right ankle sprain           ED Disposition       ED Disposition   Discharge    Condition   Stable    Date/Time   Tue May 6, 2025 10:38 PM    Comment   Leny Centeno discharge to home/self care.                   Assessment & Plan       Medical Decision Making   Patient presents with right ankle pain.  Given history, exam and work-up, patient likely has right ankle sprain.  I have low suspicion for fracture, dislocation, significant ligamentous injury, septic arthritis, gout flare, new autoimmune arthropathy or gonococcal arthropathy.    Problems Addressed:  Right ankle sprain: acute illness or injury    Amount and/or Complexity of Data Reviewed  Radiology: ordered and independent interpretation performed. Decision-making details documented in ED Course.    Risk  OTC drugs.  Prescription drug management.        ED Course as of 05/06/25 2241   Tue May 06, 2025   2237 XR foot 3+ views RIGHT   2237 XR ankle 3+ views RIGHT       Medications   HYDROcodone-acetaminophen (NORCO) 5-325 mg per tablet 1 tablet (1 tablet Oral Given 5/6/25 2222)       ED Risk Strat Scores                    No data recorded                            History of Present Illness       Chief Complaint   Patient presents with    Ankle Pain     Pt states that she stepped into a pothole less than an hour ago with her right ankle. Pt states that she felt a pop and now unable to put weight on it.  +sensation/movement in triage       Past Medical History:   Diagnosis Date    GERD (gastroesophageal reflux disease)     Kidney stone     Migraine     Pre-hypertension       Past Surgical History:   Procedure Laterality Date    EGD      KIDNEY STONE SURGERY      KS CLOSED TX NASAL BONE FX W/MNPJ W/STABILIZATION N/A 8/26/2024    Procedure: CLOSED  REDUCTION NASAL FRACTURE WITH STABILIZATION SPLINT;  Surgeon: Octavio Sharp MD;  Location:  MAIN OR;  Service: ENT      Family History   Problem Relation Age of Onset    Breast cancer Mother 46        double masectomy    Thyroid disease Mother     Asthma Father     Hyperlipidemia Father     Arthritis Maternal Grandmother     Ovarian cancer Maternal Grandmother     Heart disease Maternal Grandfather     Breast cancer Paternal Grandmother 40        onset in 40's- single masectomy    Ovarian cancer Paternal Grandmother     Heart disease Paternal Grandfather       Social History     Tobacco Use    Smoking status: Never    Smokeless tobacco: Never   Vaping Use    Vaping status: Never Used   Substance Use Topics    Alcohol use: Yes     Comment: social    Drug use: Never      E-Cigarette/Vaping    E-Cigarette Use Never User       E-Cigarette/Vaping Substances    Nicotine No     THC No     CBD No     Flavoring No     Other No     Unknown No       I have reviewed and agree with the history as documented.     Patient is a 29-year-old female presenting to the emergency department complaining of right ankle pain, states she stepped in a pothole, rolled her ankle and felt something pop, she has been unable to bear weight on it since, she denies pain or injury elsewhere        Review of Systems   Constitutional: Negative.    HENT: Negative.     Eyes: Negative.    Respiratory: Negative.     Cardiovascular: Negative.    Gastrointestinal: Negative.    Endocrine: Negative.    Genitourinary: Negative.    Musculoskeletal:  Positive for arthralgias.   Skin: Negative.    Allergic/Immunologic: Negative.    Neurological: Negative.    Hematological: Negative.    Psychiatric/Behavioral: Negative.             Objective       ED Triage Vitals [05/06/25 2216]   Temperature Pulse Blood Pressure Respirations SpO2 Patient Position - Orthostatic VS   97.5 °F (36.4 °C) 75 (!) 151/104 18 100 % Sitting      Temp Source Heart Rate Source BP Location  FiO2 (%) Pain Score    Temporal Monitor Right arm -- 8      Vitals      Date and Time Temp Pulse SpO2 Resp BP Pain Score FACES Pain Rating User   05/06/25 2222 -- -- -- -- -- 4 -- SV   05/06/25 2216 97.5 °F (36.4 °C) 75 100 % 18 151/104 8 -- SB            Physical Exam  Constitutional:       Appearance: She is well-developed.   HENT:      Head: Normocephalic and atraumatic.   Eyes:      Conjunctiva/sclera: Conjunctivae normal.      Pupils: Pupils are equal, round, and reactive to light.   Cardiovascular:      Rate and Rhythm: Normal rate.   Pulmonary:      Effort: Pulmonary effort is normal.   Abdominal:      Palpations: Abdomen is soft.   Musculoskeletal:         General: Normal range of motion.      Cervical back: Normal range of motion and neck supple.        Feet:    Feet:      Comments: Slight swelling and tenderness to the lateral malleolus of the right foot, with slight swelling and ecchymosis extending on the dorsal surface of the foot over the 4th and 5th metatarsals, no bony deformity, 2+ DP pulses, distal sensation and motor is intact  Skin:     General: Skin is warm and dry.   Neurological:      Mental Status: She is alert and oriented to person, place, and time.         Results Reviewed       None            XR ankle 3+ views RIGHT   ED Interpretation by Lisa Matthews DO (05/06 2237)   No acute findings      XR foot 3+ views RIGHT   ED Interpretation by Lisa Matthews DO (05/06 2237)   No acute findings          Splint application    Date/Time: 5/6/2025 10:40 PM    Performed by: Lisa Matthews DO  Authorized by: Lisa Matthews DO    Other Assisting Provider: No    Verbal consent obtained?: Yes    Risks and benefits: Risks, benefits and alternatives were discussed    Consent given by:  Patient  Patient states understanding of procedure being performed: Yes    Required items: Required blood products, implants, devices and special equipment available    Patient identity confirmed:  Verbally with  patient  Pre-procedure details:     Sensation:  Normal    Skin color:  Pink  Procedure details:     Laterality:  Right    Location:  Ankle    Ankle:  R ankle    Strapping: No      Splint composition: static      Splint type: Ankle Aircast.  Post-procedure details:     Pain:  Improved    Sensation:  Normal    Skin color:  Pink    Patient tolerance of procedure:  Tolerated well, no immediate complications      ED Medication and Procedure Management   Prior to Admission Medications   Prescriptions Last Dose Informant Patient Reported? Taking?   HYDROcodone-acetaminophen (Norco) 5-325 mg per tablet   No No   Sig: Take 1 tablet by mouth every 6 (six) hours as needed for pain for up to 10 doses Max Daily Amount: 4 tablets   Lo Loestrin Fe 1 MG-10 MCG / 10 MCG TABS   Yes No   Sig: Take 1 tablet by mouth every morning   cetirizine (ZyrTEC) 10 mg tablet   Yes No   Sig: Take 10 mg by mouth daily   clobetasol (TEMOVATE) 0.05 % ointment   Yes No   Sig: ONLY APPLY TO THE FINGERS ONCE A DAY AS NEEDED FOR DRY SCALY SKIN   medroxyPROGESTERone (DEPO-PROVERA) 150 mg/mL injection  Self No No   Sig: Inject 1 mL (150 mg total) into a muscle every 3 (three) months   Patient not taking: Reported on 2024   olopatadine (PATANOL) 0.1 % ophthalmic solution   Yes No   Si drop   pantoprazole (PROTONIX) 20 mg tablet   Yes No   Sig: Take 20 mg by mouth daily   sertraline (ZOLOFT) 50 mg tablet   Yes No   Sig: Take 50 mg by mouth daily at bedtime      Facility-Administered Medications Last Administration Doses Remaining   medroxyPROGESTERone (DEPO-PROVERA) IM injection 150 mg 2020  5:57 PM         Patient's Medications   Discharge Prescriptions    HYDROCODONE-ACETAMINOPHEN (NORCO) 5-325 MG PER TABLET    Take 1 tablet by mouth every 6 (six) hours as needed for pain for up to 10 doses Max Daily Amount: 4 tablets       Start Date: 2025  End Date: --       Order Dose: 1 tablet       Quantity: 10 tablet    Refills: 0       ED SEPSIS  DOCUMENTATION   Time reflects when diagnosis was documented in both MDM as applicable and the Disposition within this note       Time User Action Codes Description Comment    5/6/2025 10:38 PM Lisa Matthews Add [S93.401A] Right ankle sprain                  Lisa Matthews DO  05/06/25 2240

## (undated) DEVICE — HYDROPHILIC WOUND DRESSING WITH ZINC PLUS VITAMINS A AND B6.: Brand: DERMAGRAN®-B

## (undated) DEVICE — GAUZE SPONGES,8 PLY: Brand: CURITY

## (undated) DEVICE — SINGLE PORT MANIFOLD: Brand: NEPTUNE 2

## (undated) DEVICE — GLOVE INDICATOR PI UNDERGLOVE SZ 8 BLUE

## (undated) DEVICE — SPLINT THE DENVER SM

## (undated) DEVICE — MEDI-VAC YANK SUCT HNDL W/TPRD BULBOUS TIP: Brand: CARDINAL HEALTH

## (undated) DEVICE — DISPOSABLE OR TOWEL: Brand: CARDINAL HEALTH

## (undated) DEVICE — 3M™ STERI-STRIP™ COMPOUND BENZOIN TINCTURE 40 BAGS/CARTON 4 CARTONS/CASE C1544: Brand: 3M™ STERI-STRIP™

## (undated) DEVICE — STERILE BETHLEHEM NASAL PACK: Brand: CARDINAL HEALTH